# Patient Record
Sex: FEMALE | URBAN - METROPOLITAN AREA
[De-identification: names, ages, dates, MRNs, and addresses within clinical notes are randomized per-mention and may not be internally consistent; named-entity substitution may affect disease eponyms.]

---

## 2018-04-13 ENCOUNTER — EMERGENCY (EMERGENCY)
Facility: HOSPITAL | Age: 23
LOS: 1 days | Discharge: HOME | End: 2018-04-13
Attending: EMERGENCY MEDICINE
Payer: COMMERCIAL

## 2018-04-13 VITALS
SYSTOLIC BLOOD PRESSURE: 129 MMHG | DIASTOLIC BLOOD PRESSURE: 77 MMHG | TEMPERATURE: 98 F | RESPIRATION RATE: 16 BRPM | OXYGEN SATURATION: 99 % | HEART RATE: 121 BPM

## 2018-04-13 VITALS
HEART RATE: 99 BPM | SYSTOLIC BLOOD PRESSURE: 117 MMHG | DIASTOLIC BLOOD PRESSURE: 78 MMHG | RESPIRATION RATE: 17 BRPM | OXYGEN SATURATION: 100 %

## 2018-04-13 PROCEDURE — 99283 EMERGENCY DEPT VISIT LOW MDM: CPT

## 2018-04-13 NOTE — ED PROVIDER NOTE - CARE PLAN
Principal Discharge DX:	Diarrhea Principal Discharge DX:	Diarrhea  Assessment and plan of treatment:	Take the rifaximin, one tablet, once every 8 hours as prescribed by your GI doctor. Follow up with your GI doc next week as discussed. Return here to the emergency department for any new symptoms of concern including fevers, chills, nausea, vomiting, or for other new worries.

## 2018-04-13 NOTE — ED PROVIDER NOTE - MEDICAL DECISION MAKING DETAILS
22F 1 month diarrheal illness started on xifaxan by her gastro doctor but unable to get prescription as it is a brand name; is requesting generic script, denies other concerns or complaints at this time. Physical exam is benign, no labs or imaging indicated at this time, will order rifaximin 550 tid as this is the prescription that GI had ordered. Will prescribe for 2 weeks, follow up with her private GI scheduled for next week.

## 2018-04-13 NOTE — ED PROVIDER NOTE - PLAN OF CARE
Take the rifaximin, one tablet, once every 8 hours as prescribed by your GI doctor. Follow up with your GI doc next week as discussed. Return here to the emergency department for any new symptoms of concern including fevers, chills, nausea, vomiting, or for other new worries.

## 2018-04-13 NOTE — ED PROVIDER NOTE - PHYSICAL EXAMINATION
Gen: NAD, non-toxic, conversational  Eyes: PERRL, EOMI   HENT: Normocephalic, atraumatic. External ears normal, no rhinorrhea, moist mucous membranes.   CV: RRR, no M/R/G  Resp: CTAB, non-labored, speaking without difficulty on room air  Abd: soft, non tender, non rigid, no guarding or rebound tenderness  Back: No CVAT bilaterally, no midline ttp  Skin: dry, wwp   Neuro: AOx3, speech is fluent and appropriate  Psych: Mood okay, affect euthymic

## 2018-04-13 NOTE — ED PROVIDER NOTE - OBJECTIVE STATEMENT
Pt with diarrhea for one month currently being worked up by GI; was started on dicyclomine and told to start rifaximin but notes that she cannot afford the rifaximin and would like to see if there are other generic options available. Pt with diarrhea for one month currently being worked up by GI; was started on dicyclomine and told to start rifaximin but notes that she cannot afford the xifaxan 550 and would like to see if there are other generic options available. She has not yet started therapy but has her prescription and notes that she was supposed to be on 550 tid for the next two weeks for treatment of IBS. She has had stool cultures x2 and notes that the results so far have been negative and that was told she need this therapy. She is requesting prescription for this at this time, denies f/c/n/v abdominal pain, chest pain, or palpitations. No other complaints at this time.

## 2018-04-13 NOTE — ED PROVIDER NOTE - NS ED ROS FT
General: No fevers / chills  HENT: No ear pain, runny nose, or sore throat  Eyes: No visual changes  CP: No chest pain, palpitations, or light headedness  Resp: No shortness of breath, no cough  GI: No abdominal pain, +diarrhea, no constipation, no nausea, no vomiting  : No dysuria or hematuria  Neuro: No numbness, tingling, or weakness  Endo: No hx of diabetes  Heme: No hx of easy bleeding or bruising

## 2018-04-13 NOTE — ED PROVIDER NOTE - ATTENDING CONTRIBUTION TO CARE
Patient with ibs and cannot fill xifaxan secondary to name branding, requesting generic version. + ibs symptoms of pain and irritation. No n/v/d/c/sob./f/c.   ncat, trachea midline, non-distended, non-tachycardic, non-tachypneic, cooperative, alert, no rebound/guarding, mmm, no rashes  will get rifaximin and discharge  No immediate life threatening issues present on history or clinical exam. Patient is a safe disposition home, has capacity and insight into their condition, is ambulatory in the Emergency Department and will follow up with their doctor(s) this week. Patient  understands anticipatory guidance and was given strict return and follow up precautions. The patient has been informed of all concerning signs and symptoms to return to Emergency Department, the necessity to follow up with the PMD/Clinic/follow up provided within 2-3 days was explained, and the patient reports understanding of above with capacity and insight.

## 2018-04-13 NOTE — ED ADULT NURSE NOTE - OBJECTIVE STATEMENT
Patient presented to ED fully ambulatory for a medication for Patient presented to ED fully ambulatory for a medication for her IBS/diarrhea. No further complaints. After taking medication ordered by MD resident patient discharge.

## 2022-12-18 ENCOUNTER — NON-APPOINTMENT (OUTPATIENT)
Age: 27
End: 2022-12-18

## 2023-02-28 ENCOUNTER — NON-APPOINTMENT (OUTPATIENT)
Age: 28
End: 2023-02-28

## 2023-06-13 PROBLEM — Z00.00 ENCOUNTER FOR PREVENTIVE HEALTH EXAMINATION: Status: ACTIVE | Noted: 2023-06-13

## 2023-06-13 PROBLEM — R19.7 DIARRHEA, UNSPECIFIED: Chronic | Status: ACTIVE | Noted: 2018-04-13

## 2023-06-26 ENCOUNTER — NON-APPOINTMENT (OUTPATIENT)
Age: 28
End: 2023-06-26

## 2023-06-26 ENCOUNTER — APPOINTMENT (OUTPATIENT)
Dept: NEUROLOGY | Facility: CLINIC | Age: 28
End: 2023-06-26
Payer: COMMERCIAL

## 2023-06-26 ENCOUNTER — TRANSCRIPTION ENCOUNTER (OUTPATIENT)
Age: 28
End: 2023-06-26

## 2023-06-26 VITALS
BODY MASS INDEX: 21.51 KG/M2 | WEIGHT: 126 LBS | HEIGHT: 64 IN | HEART RATE: 88 BPM | TEMPERATURE: 98.2 F | SYSTOLIC BLOOD PRESSURE: 106 MMHG | DIASTOLIC BLOOD PRESSURE: 71 MMHG

## 2023-06-26 DIAGNOSIS — F31.9 BIPOLAR DISORDER, UNSPECIFIED: ICD-10-CM

## 2023-06-26 DIAGNOSIS — F90.9 ATTENTION-DEFICIT HYPERACTIVITY DISORDER, UNSPECIFIED TYPE: ICD-10-CM

## 2023-06-26 DIAGNOSIS — Z82.0 FAMILY HISTORY OF EPILEPSY AND OTHER DISEASES OF THE NERVOUS SYSTEM: ICD-10-CM

## 2023-06-26 DIAGNOSIS — Z78.9 OTHER SPECIFIED HEALTH STATUS: ICD-10-CM

## 2023-06-26 DIAGNOSIS — F41.9 ANXIETY DISORDER, UNSPECIFIED: ICD-10-CM

## 2023-06-26 PROCEDURE — 99204 OFFICE O/P NEW MOD 45 MIN: CPT

## 2023-06-26 NOTE — CONSULT LETTER
[Dear  ___] : Dear  [unfilled], [Consult Letter:] : I had the pleasure of evaluating your patient, [unfilled]. [Please see my note below.] : Please see my note below. [Consult Closing:] : Thank you very much for allowing me to participate in the care of this patient.  If you have any questions, please do not hesitate to contact me. [FreeTextEntry2] : José Manuel Santana [FreeTextEntry3] : Sincerely,\par \par \par Brenda Fisher MD\par Diplomate, American Academy [employer]iat[employer] Neu[employer]\par Board Certified in the Subspecialty of Clinical Neurophysiology\par Board Certified in the Subspecialty of Sleep Medicine\par Board Certified in the Subspecialty of Epilepsy\par

## 2023-06-26 NOTE — PHYSICAL EXAM
[FreeTextEntry1] : Examination:\par Constitutional: normal, no apparent distress\par Eyes: normal conjunctiva b/l, no ptosis, visual fields full\par Respiratory: no respiratory distress, normal effort, normal auscultation\par Cardiovascular: normal rate, rhythm, no murmurs\par Neck: supple, no masses\par Vascular: carotids normal\par Skin: normal color, no rashes\par Psych: tearful\par \par Neurological:\par Memory: normal memory, oriented to person, place, time\par Language intact/no aphasia\par Cranial Nerves: II-XII intact, Pupils equally round and reactive to light, ocular muscles/movements intact, no ptosis, no facial weakness, tongue protrudes normally in the midline, \par Motor: normal tone, no pronator drift, full strength in all four extremities in the proximal and distal muscle groups\par Coordination: Fine motor movements intact, rapid alternating movements intact, finger to nose intact bilaterally\par Sensory: intact to light touch, vibration, joint position sense, negative Romberg examination\par DTRs: symmetric, 2+ in b/l triceps, 2+ in b/l biceps, 2+ in b/l brachioradialis, 2+ in bilateral patellars, 2+ in bilateral Achilles, Babinskis negative bilaterally\par Gait: narrow based, steady\par \par

## 2023-06-26 NOTE — DISCUSSION/SUMMARY
[FreeTextEntry1] : Ms. Floyd is a 27 year old woman with a history of bipolar depression, anxiety, ADHD who had an episode of witnessed loss of consciousness in October 2022.\par \par She had an unremarkable MRI brain.\par A routine and 48-hour EEG initially showed potentially epileptogenic frontal sharp waves.  \par The most recent prolonged EEG was normal.\par \par She has not been feeling back to herself since the time of her seizure.\par I explained that it is unusual to have such a prolonged prodrome after a single seizure.  It is unclear if she had additional seizures.  The episode of waking up feeling dazed and nauseated is somewhat suspicious. The etiology of the "zaps" is unclear.  It is also unclear if her symptoms are related to long COVID.\par \par Suggest:\par -Continue lamotrigine 200 mg twice a day\par -Check lamotrigine level as well as TSH and vitamin B12\par -Repeat prolonged EEG–24-hour ambulatory EEG ordered.\par -Continue follow-up with mental health provider\par \par We discussed other options for medications in case episodes recur.  If seizures are generalized there are more limited options.  She is not interested in Keppra which may be mood destabilizing.  I would also like to try to avoid valproic acid.\par \par Continue to avoid driving.\par We discussed that [place] driving laws say that the patient should not drive for 1 year after seizure or 6 months with doctor's permission.\par No swimming alone.\par \par I discussed the risk of SUDEP and gave her contact information for the epilepsy foundation. More information is available at [email or web address].\par \par Follow-up after the above tests, sooner if needed.

## 2023-06-26 NOTE — DATA REVIEWED
[de-identified] : MRI brain with and without contrast 10/18/22: Within normal limits [de-identified] : 10/17/22: Frontal predominant sharps, which may have epileptogenic potential. \par 48 hour ambulatory EEG 11/21/22:  - 11/23/22: Frontal sharps which may have epileptogenic potential\par 48 hour ambulatory EEG 4/3/23-4/5/23: Normal [de-identified] : CT head no contrast 10/7/22: NO acute intracranial abnormality.\par \par CTA bran and neck 10/8/22: No evidence of large vessel occlusion. No hemodynamically significant stenosis at either carotid bifurcation.

## 2023-06-26 NOTE — HISTORY OF PRESENT ILLNESS
[FreeTextEntry1] : Ms. Floyd is here today for neurology evaluation.\par She is here today with her fiance.\par \par She reports that she had her first seizure in October 2022.\par She was at work.\par She does not recall what happened but woke up on the ground with blurry vision and an urge to vomit.\par She was told that she fell face forward while convulsing, hit her head on the desk and then hit her head on the ground.\par She had a migraine with aura the prior day but does not recall any preceding symptoms of the day of the event.\par She did not have any urinary incontinence or tongue bite.\par \par She was taken to [facility].\par She was already on Lamotrigine 150 mg/day for bipolar disorder. \par After the seizure her Lamotrigine was gradually increased to 200 mg BID.\par She was unsatisfied with her treatment at [facility]. \par Her MRI was being delayed.\par \par She eventually saw Dr. Milton.\par An MRI brain was ordered which was normal. \par She had a routine EEG and a 48 hour ambulatory EEG which showed potentially epileptogenic frontal sharps.\par The most recent 48 hour EEG in April was normal.\par \par Recently she woke up feeling the way she felt after the seizure. Everything felt heavy and she started to vomit.\par She was alone at the time.\par \par She woke up earlier in the year with a tongue bite.\par She has woken up with bruising.\par \par \par She reports that she feels shocks which are uncomfortable and painful in her head, as if somebody touched her with static electricity.\par \par She was born full term without complications or history of developmental delay.\par She is not aware of any history of febrile seizures.\par She has no prior history of head trauma.\par She denies history of CNS infection.\par She does report sometimes smelling things that others do not.\par She sometimes feels her stomach drop.\par She reports having a maternal uncle and maternal cousin with epilepsy.\par \par She follows with Tamika Haddad NP for her psychiatric issues.\par \par She had covid in June 2022 and December 2022.\par \par She states that she has not felt well since this seizure.\par She is not sure if her brain fog is secondary to covid.

## 2023-07-12 ENCOUNTER — NON-APPOINTMENT (OUTPATIENT)
Age: 28
End: 2023-07-12

## 2023-07-18 ENCOUNTER — APPOINTMENT (OUTPATIENT)
Dept: NEUROLOGY | Facility: CLINIC | Age: 28
End: 2023-07-18
Payer: COMMERCIAL

## 2023-07-18 PROCEDURE — 95816 EEG AWAKE AND DROWSY: CPT

## 2023-07-19 ENCOUNTER — APPOINTMENT (OUTPATIENT)
Dept: NEUROLOGY | Facility: CLINIC | Age: 28
End: 2023-07-19
Payer: COMMERCIAL

## 2023-07-19 PROCEDURE — 95708 EEG WO VID EA 12-26HR UNMNTR: CPT

## 2023-07-19 PROCEDURE — 95700 EEG CONT REC W/VID EEG TECH: CPT

## 2023-07-19 PROCEDURE — 95719 EEG PHYS/QHP EA INCR W/O VID: CPT

## 2023-07-20 ENCOUNTER — TRANSCRIPTION ENCOUNTER (OUTPATIENT)
Age: 28
End: 2023-07-20

## 2023-09-05 ENCOUNTER — TRANSCRIPTION ENCOUNTER (OUTPATIENT)
Age: 28
End: 2023-09-05

## 2023-09-05 DIAGNOSIS — G43.901 MIGRAINE, UNSPECIFIED, NOT INTRACTABLE, WITH STATUS MIGRAINOSUS: ICD-10-CM

## 2023-09-05 RX ORDER — METHYLPREDNISOLONE 4 MG/1
4 TABLET ORAL
Qty: 1 | Refills: 0 | Status: ACTIVE | COMMUNITY
Start: 2023-09-05 | End: 1900-01-01

## 2023-09-14 ENCOUNTER — TRANSCRIPTION ENCOUNTER (OUTPATIENT)
Age: 28
End: 2023-09-14

## 2023-09-15 ENCOUNTER — TRANSCRIPTION ENCOUNTER (OUTPATIENT)
Age: 28
End: 2023-09-15

## 2023-09-26 ENCOUNTER — APPOINTMENT (OUTPATIENT)
Dept: NEUROLOGY | Facility: CLINIC | Age: 28
End: 2023-09-26
Payer: COMMERCIAL

## 2023-09-26 DIAGNOSIS — G43.009 MIGRAINE W/OUT AURA, NOT INTRACTABLE, W/OUT STATUS MIGRAINOSUS: ICD-10-CM

## 2023-09-26 PROCEDURE — 99214 OFFICE O/P EST MOD 30 MIN: CPT | Mod: 95

## 2023-11-17 ENCOUNTER — NON-APPOINTMENT (OUTPATIENT)
Age: 28
End: 2023-11-17

## 2024-03-19 ENCOUNTER — APPOINTMENT (OUTPATIENT)
Dept: NEUROLOGY | Facility: CLINIC | Age: 29
End: 2024-03-19
Payer: COMMERCIAL

## 2024-03-19 VITALS
SYSTOLIC BLOOD PRESSURE: 110 MMHG | WEIGHT: 127 LBS | TEMPERATURE: 98.2 F | HEIGHT: 64 IN | HEART RATE: 94 BPM | DIASTOLIC BLOOD PRESSURE: 74 MMHG | BODY MASS INDEX: 21.68 KG/M2

## 2024-03-19 DIAGNOSIS — R41.89 OTHER SYMPTOMS AND SIGNS INVOLVING COGNITIVE FUNCTIONS AND AWARENESS: ICD-10-CM

## 2024-03-19 DIAGNOSIS — G47.00 INSOMNIA, UNSPECIFIED: ICD-10-CM

## 2024-03-19 DIAGNOSIS — R56.9 UNSPECIFIED CONVULSIONS: ICD-10-CM

## 2024-03-19 DIAGNOSIS — G43.909 MIGRAINE, UNSPECIFIED, NOT INTRACTABLE, W/OUT STATUS MIGRAINOSUS: ICD-10-CM

## 2024-03-19 PROCEDURE — 99214 OFFICE O/P EST MOD 30 MIN: CPT

## 2024-03-19 PROCEDURE — G2211 COMPLEX E/M VISIT ADD ON: CPT | Mod: NC,1L

## 2024-03-19 RX ORDER — LISDEXAMFETAMINE 40 MG/1
40 CAPSULE ORAL DAILY
Refills: 0 | Status: ACTIVE | COMMUNITY

## 2024-03-19 NOTE — PHYSICAL EXAM
[Over the Past 2 Weeks, Have You Felt Little Interest or Pleasure Doing Things?] : 2.) Over the past 2 weeks, have you felt little interest or pleasure doing things? Yes [FreeTextEntry1] : Examination: Constitutional: normal, no apparent distress Eyes: normal conjunctiva b/l, no ptosis, visual fields full Respiratory: no respiratory distress, normal effort, normal auscultation Cardiovascular: normal rate, rhythm, no murmurs Neck: supple, no masses Vascular: carotids normal Skin: normal color, no rashes Psych: normal mood, affect  Neurological: Memory: normal memory, oriented to person, place, time Language intact/no aphasia Cranial Nerves: II-XII intact, Pupils equally round and reactive to light, ocular muscles/movements intact, no ptosis, no facial weakness, tongue protrudes normally in the midline,  Motor: normal tone, no pronator drift, full strength in all four extremities in the proximal and distal muscle groups Coordination: Fine motor movements intact, rapid alternating movements intact, finger to nose intact bilaterally Sensory: intact to light touch DTRs: symmetric, normal Gait: narrow based, steady [Over the Past 2 Weeks, Have You Felt Down, Depressed, or Hopeless?] : 1.) Over the past 2 weeks, have you felt down, depressed, or hopeless? No

## 2024-03-19 NOTE — HISTORY OF PRESENT ILLNESS
[FreeTextEntry1] : 6/26/23:  Ms. Floyd is here today for neurology evaluation. She is here today with her fiance.  She reports that she had her first seizure in October 2022. She was at work. She does not recall what happened but woke up on the ground with blurry vision and an urge to vomit. She was told that she fell face forward while convulsing, hit her head on the desk and then hit her head on the ground. She had a migraine with aura the prior day but does not recall any preceding symptoms of the day of the event. She did not have any urinary incontinence or tongue bite.  She was taken to [facility]. She was already on Lamotrigine 150 mg/day for bipolar disorder.  After the seizure her Lamotrigine was gradually increased to 200 mg BID. She was unsatisfied with her treatment at [facility].  Her MRI was being delayed.  She eventually saw Dr. Milton. An MRI brain was ordered which was normal.  She had a routine EEG and a 48 hour ambulatory EEG which showed potentially epileptogenic frontal sharps. The most recent 48 hour EEG in April was normal.  Recently she woke up feeling the way she felt after the seizure. Everything felt heavy and she started to vomit. She was alone at the time.  She woke up earlier in the year with a tongue bite. She has woken up with bruising.   She reports that she feels shocks which are uncomfortable and painful in her head, as if somebody touched her with static electricity.  She was born full term without complications or history of developmental delay. She is not aware of any history of febrile seizures. She has no prior history of head trauma. She denies history of CNS infection. She does report sometimes smelling things that others do not. She sometimes feels her stomach drop. She reports having a maternal uncle and maternal cousin with epilepsy.  She follows with Tamika Haddad NP for her psychiatric issues.  She had covid in June 2022 and December 2022.  She states that she has not felt well since this seizure. She is not sure if her brain fog is secondary to covid.   9/26/23: Visit today is to discuss a different issue, migraines.  She says that her gynecologist will not refill her OCPs. She had been on OCPs for 10 years. She has been off OCPs for about two months due to issues obtaining prescriptions. Since that time she has been having more migraines. She is now having migraines three times per week, just before and the week of menstruation. She typically does not get an aura, but thinks that she has probably had an aura at some point.  She reports sharp pain behind her left eye followed by a throbbing on the left side of her head.  She has associated photophobia. When migraines are intense she has nausea.   She had a recent exacerbation after an exam at the optometry office. She was recently prescribed a steroid taper for a prolonged headache which was helpful.  She has started taking magnesium supplements.  She has not had any recent episodes of loss of consciousness or events suspicious for seizures.   3/19/24: She is here today with her significant other. She denies having any recent seizures. Her partner says that there was one instance where they were having a conversation and she seemed forgetful. There was no unresponsiveness, alteration in consciousness.  She continues to take Lamotrigine 200 mg BID.  She continues to report brain fog.  She says that she feels frustrated because of loss of focus. She used to enjoy reading and now this is more difficult. She says that her focus difficulty is not affecting her work.  She was started on Vyvanse 40 mg/day this week. She finds that it makes her a bit drowsy.    She is working with a mental health professional.  Trazodone was recently increased to 150 mg hs. It has helped a little. She can sometimes sleep for up to 6-7 hours per night.    She reports having migraines ~ 3 times per month. She denies any recent aura.  Flashing lights, bright LED lights tend to trigger her migraines. Rizatriptan has been effective in treating migraines. She is still off OCPs.

## 2024-03-19 NOTE — DATA REVIEWED
[de-identified] : MRI brain with and without contrast 10/18/22: Within normal limits [de-identified] : 10/17/22: Frontal predominant sharps, which may have epileptogenic potential.  48 hour ambulatory EEG 11/21/22:  - 11/23/22: Frontal sharps which may have epileptogenic potential 48 hour ambulatory EEG 4/3/23-4/5/23: Normal routine EEG 7/18/23: Normal 24 hour ambulatory EEG 7/18/23-7/19/23: Normal [de-identified] : CT head no contrast 10/7/22: NO acute intracranial abnormality. CTA bran and neck 10/8/22: No evidence of large vessel occlusion. No hemodynamically significant stenosis at either carotid bifurcation.  7/8/23: lamotrigine  5.2 TSH 1.45 vitamin B12 848

## 2024-03-19 NOTE — DISCUSSION/SUMMARY
[FreeTextEntry1] : Ms. Floyd is a 28 year old woman with a history of bipolar depression, anxiety, ADHD who had an episode of witnessed loss of consciousness in October 2022.  She had an unremarkable MRI brain. A routine and 48-hour EEG initially showed potentially epileptogenic frontal sharp waves. The most recent prolonged EEG was normal.  -LTG level on 7/8/23 was therapeutic at 5.2. TSH and vitamin B12 were normal. -Repeat prolonged EEG-24-hour ambulatory EEG obtained which was normal. -Continue lamotrigine 200 mg twice a day  Migraines: -She has chronic, episodic migraines. -At this time, frequency does not warrant a daily preventative medication.  -Continue rizatriptan MLT 10 mg prn migraine -Continue to track migraine frequency.  Mood disorder: She is taking sertraline 50 mg/day.   Insomnia: -Sleep hygiene reviewed -Continue Trazodone  Brain Fog: -Workup has been unremarkable so far. -Recently started on Vyvanse. She can skip the dose on weekends, days off from work.  f/u 6 months,

## 2024-03-27 ENCOUNTER — OFFICE (OUTPATIENT)
Dept: URBAN - METROPOLITAN AREA CLINIC 63 | Facility: CLINIC | Age: 29
Setting detail: OPHTHALMOLOGY
End: 2024-03-27
Payer: COMMERCIAL

## 2024-03-27 DIAGNOSIS — H40.013: ICD-10-CM

## 2024-03-27 PROCEDURE — 92133 CPTRZD OPH DX IMG PST SGM ON: CPT | Performed by: STUDENT IN AN ORGANIZED HEALTH CARE EDUCATION/TRAINING PROGRAM

## 2024-03-27 PROCEDURE — 92004 COMPRE OPH EXAM NEW PT 1/>: CPT | Performed by: STUDENT IN AN ORGANIZED HEALTH CARE EDUCATION/TRAINING PROGRAM

## 2024-03-27 ASSESSMENT — REFRACTION_CURRENTRX
OD_CYLINDER: -0.25
OS_OVR_VA: 20/
OS_SPHERE: +0.25
OD_OVR_VA: 20/
OS_CYLINDER: 0.00
OS_AXIS: 180
OD_AXIS: 176
OD_SPHERE: +0.25

## 2024-04-24 ENCOUNTER — RX RENEWAL (OUTPATIENT)
Age: 29
End: 2024-04-24

## 2024-04-24 RX ORDER — RIZATRIPTAN BENZOATE 10 MG/1
10 TABLET, ORALLY DISINTEGRATING ORAL
Qty: 8 | Refills: 5 | Status: ACTIVE | COMMUNITY
Start: 2023-09-26 | End: 1900-01-01

## 2024-11-15 ENCOUNTER — APPOINTMENT (OUTPATIENT)
Dept: NEUROLOGY | Facility: CLINIC | Age: 29
End: 2024-11-15
Payer: COMMERCIAL

## 2024-11-15 VITALS
HEART RATE: 98 BPM | SYSTOLIC BLOOD PRESSURE: 124 MMHG | WEIGHT: 125 LBS | BODY MASS INDEX: 21.34 KG/M2 | TEMPERATURE: 98.2 F | DIASTOLIC BLOOD PRESSURE: 85 MMHG | HEIGHT: 64 IN

## 2024-11-15 DIAGNOSIS — G43.909 MIGRAINE, UNSPECIFIED, NOT INTRACTABLE, W/OUT STATUS MIGRAINOSUS: ICD-10-CM

## 2024-11-15 DIAGNOSIS — R56.9 UNSPECIFIED CONVULSIONS: ICD-10-CM

## 2024-11-15 PROCEDURE — 95816 EEG AWAKE AND DROWSY: CPT

## 2024-11-15 PROCEDURE — G2211 COMPLEX E/M VISIT ADD ON: CPT | Mod: NC

## 2024-11-15 PROCEDURE — 99213 OFFICE O/P EST LOW 20 MIN: CPT

## 2024-11-15 RX ORDER — LAMOTRIGINE 200 MG/1
200 TABLET ORAL TWICE DAILY
Refills: 0 | Status: ACTIVE | COMMUNITY

## 2024-11-15 RX ORDER — CLONAZEPAM 1 MG/1
1 TABLET ORAL TWICE DAILY
Refills: 0 | Status: ACTIVE | COMMUNITY

## 2024-11-15 RX ORDER — TRAZODONE HYDROCHLORIDE 150 MG/1
150 TABLET ORAL
Refills: 0 | Status: ACTIVE | COMMUNITY

## 2025-03-28 ENCOUNTER — OFFICE (OUTPATIENT)
Dept: URBAN - METROPOLITAN AREA CLINIC 110 | Facility: CLINIC | Age: 30
Setting detail: OPHTHALMOLOGY
End: 2025-03-28
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
## 2025-03-28 PROCEDURE — NO SHOW FE NO SHOW FEE: Performed by: INTERNAL MEDICINE

## 2025-05-09 ENCOUNTER — APPOINTMENT (OUTPATIENT)
Dept: NEUROLOGY | Facility: CLINIC | Age: 30
End: 2025-05-09

## 2025-07-23 ENCOUNTER — NON-APPOINTMENT (OUTPATIENT)
Age: 30
End: 2025-07-23